# Patient Record
Sex: FEMALE | Race: ASIAN | URBAN - METROPOLITAN AREA
[De-identification: names, ages, dates, MRNs, and addresses within clinical notes are randomized per-mention and may not be internally consistent; named-entity substitution may affect disease eponyms.]

---

## 2022-12-12 ENCOUNTER — OFFICE VISIT (OUTPATIENT)
Dept: FAMILY MEDICINE CLINIC | Facility: CLINIC | Age: 47
End: 2022-12-12
Payer: COMMERCIAL

## 2022-12-12 VITALS
OXYGEN SATURATION: 99 % | DIASTOLIC BLOOD PRESSURE: 79 MMHG | SYSTOLIC BLOOD PRESSURE: 115 MMHG | BODY MASS INDEX: 18.88 KG/M2 | HEIGHT: 61 IN | HEART RATE: 77 BPM | TEMPERATURE: 98 F | WEIGHT: 100 LBS | RESPIRATION RATE: 16 BRPM

## 2022-12-12 DIAGNOSIS — R39.9 UTI SYMPTOMS: ICD-10-CM

## 2022-12-12 DIAGNOSIS — N30.00 ACUTE CYSTITIS WITHOUT HEMATURIA: Primary | ICD-10-CM

## 2022-12-12 LAB
BILIRUBIN: NEGATIVE
GLUCOSE (URINE DIPSTICK): NEGATIVE MG/DL
KETONES (URINE DIPSTICK): NEGATIVE MG/DL
MULTISTIX LOT#: ABNORMAL NUMERIC
NITRITE, URINE: NEGATIVE
OCCULT BLOOD: NEGATIVE
PH, URINE: 6.5 (ref 4.5–8)
PROTEIN (URINE DIPSTICK): NEGATIVE MG/DL
SPECIFIC GRAVITY: 1.01 (ref 1–1.03)
URINE-COLOR: YELLOW
UROBILINOGEN,SEMI-QN: 0.2 MG/DL (ref 0–1.9)

## 2022-12-12 PROCEDURE — 87086 URINE CULTURE/COLONY COUNT: CPT | Performed by: NURSE PRACTITIONER

## 2022-12-12 RX ORDER — CHLORAL HYDRATE 500 MG
CAPSULE ORAL
COMMUNITY

## 2022-12-12 RX ORDER — NICOTINE POLACRILEX 2 MG
GUM BUCCAL
COMMUNITY

## 2022-12-12 RX ORDER — PHENAZOPYRIDINE HYDROCHLORIDE 200 MG/1
200 TABLET, FILM COATED ORAL 3 TIMES DAILY PRN
Qty: 9 TABLET | Refills: 0 | Status: SHIPPED | OUTPATIENT
Start: 2022-12-12

## 2022-12-12 RX ORDER — NITROFURANTOIN 25; 75 MG/1; MG/1
100 CAPSULE ORAL 2 TIMES DAILY
Qty: 10 CAPSULE | Refills: 0 | Status: SHIPPED | OUTPATIENT
Start: 2022-12-12 | End: 2022-12-17

## 2023-03-07 ENCOUNTER — OFFICE VISIT (OUTPATIENT)
Dept: OBGYN CLINIC | Facility: CLINIC | Age: 48
End: 2023-03-07
Payer: COMMERCIAL

## 2023-03-07 VITALS
HEIGHT: 61 IN | BODY MASS INDEX: 19 KG/M2 | WEIGHT: 100.63 LBS | HEART RATE: 79 BPM | SYSTOLIC BLOOD PRESSURE: 118 MMHG | DIASTOLIC BLOOD PRESSURE: 62 MMHG

## 2023-03-07 DIAGNOSIS — Z12.11 SCREEN FOR COLON CANCER: ICD-10-CM

## 2023-03-07 DIAGNOSIS — Z01.419 WELL WOMAN EXAM WITH ROUTINE GYNECOLOGICAL EXAM: Primary | ICD-10-CM

## 2023-03-07 DIAGNOSIS — Z11.51 SCREENING FOR HUMAN PAPILLOMAVIRUS (HPV): ICD-10-CM

## 2023-03-07 DIAGNOSIS — N92.6 IRREGULAR MENSES: ICD-10-CM

## 2023-03-07 DIAGNOSIS — Z12.4 SCREENING FOR CERVICAL CANCER: ICD-10-CM

## 2023-03-07 DIAGNOSIS — Z12.31 ENCOUNTER FOR SCREENING MAMMOGRAM FOR MALIGNANT NEOPLASM OF BREAST: ICD-10-CM

## 2023-03-07 PROCEDURE — 3074F SYST BP LT 130 MM HG: CPT | Performed by: NURSE PRACTITIONER

## 2023-03-07 PROCEDURE — 3078F DIAST BP <80 MM HG: CPT | Performed by: NURSE PRACTITIONER

## 2023-03-07 PROCEDURE — 87624 HPV HI-RISK TYP POOLED RSLT: CPT | Performed by: NURSE PRACTITIONER

## 2023-03-07 PROCEDURE — 99396 PREV VISIT EST AGE 40-64: CPT | Performed by: NURSE PRACTITIONER

## 2023-03-07 PROCEDURE — 3008F BODY MASS INDEX DOCD: CPT | Performed by: NURSE PRACTITIONER

## 2023-03-07 RX ORDER — COVID-19 ANTIGEN TEST
KIT MISCELLANEOUS
COMMUNITY
Start: 2022-10-11

## 2023-03-07 RX ORDER — VITAMIN E 268 MG
400 CAPSULE ORAL DAILY
COMMUNITY

## 2023-03-08 LAB — HPV I/H RISK 1 DNA SPEC QL NAA+PROBE: NEGATIVE

## 2023-03-23 LAB
LAST PAP RESULT: NORMAL
PAP HISTORY (OTHER THAN LAST PAP): NORMAL

## 2023-10-09 ENCOUNTER — OFFICE VISIT (OUTPATIENT)
Dept: FAMILY MEDICINE CLINIC | Facility: CLINIC | Age: 48
End: 2023-10-09
Payer: COMMERCIAL

## 2023-10-09 VITALS
TEMPERATURE: 98 F | HEART RATE: 89 BPM | OXYGEN SATURATION: 97 % | HEIGHT: 61 IN | WEIGHT: 101 LBS | DIASTOLIC BLOOD PRESSURE: 72 MMHG | SYSTOLIC BLOOD PRESSURE: 106 MMHG | RESPIRATION RATE: 16 BRPM | BODY MASS INDEX: 19.07 KG/M2

## 2023-10-09 DIAGNOSIS — H92.02 OTALGIA OF LEFT EAR: ICD-10-CM

## 2023-10-09 DIAGNOSIS — H69.92 ETD (EUSTACHIAN TUBE DYSFUNCTION), LEFT: Primary | ICD-10-CM

## 2023-10-09 DIAGNOSIS — J34.89 SINUS PAIN: ICD-10-CM

## 2023-10-09 DIAGNOSIS — J06.9 VIRAL URI WITH COUGH: ICD-10-CM

## 2023-10-09 PROCEDURE — 99213 OFFICE O/P EST LOW 20 MIN: CPT | Performed by: NURSE PRACTITIONER

## 2023-10-09 PROCEDURE — 3008F BODY MASS INDEX DOCD: CPT | Performed by: NURSE PRACTITIONER

## 2023-10-09 PROCEDURE — 3078F DIAST BP <80 MM HG: CPT | Performed by: NURSE PRACTITIONER

## 2023-10-09 PROCEDURE — 3074F SYST BP LT 130 MM HG: CPT | Performed by: NURSE PRACTITIONER

## 2023-10-09 RX ORDER — FLUTICASONE PROPIONATE 50 MCG
2 SPRAY, SUSPENSION (ML) NASAL DAILY
Qty: 1 EACH | Refills: 0 | Status: SHIPPED | OUTPATIENT
Start: 2023-10-09

## 2023-10-09 RX ORDER — BENZONATATE 200 MG/1
200 CAPSULE ORAL 3 TIMES DAILY PRN
Qty: 30 CAPSULE | Refills: 0 | Status: SHIPPED | OUTPATIENT
Start: 2023-10-09

## 2024-03-02 ENCOUNTER — OFFICE VISIT (OUTPATIENT)
Dept: FAMILY MEDICINE CLINIC | Facility: CLINIC | Age: 49
End: 2024-03-02
Payer: COMMERCIAL

## 2024-03-02 VITALS
RESPIRATION RATE: 16 BRPM | BODY MASS INDEX: 18.88 KG/M2 | DIASTOLIC BLOOD PRESSURE: 65 MMHG | HEART RATE: 83 BPM | SYSTOLIC BLOOD PRESSURE: 105 MMHG | WEIGHT: 100 LBS | HEIGHT: 61 IN | OXYGEN SATURATION: 98 % | TEMPERATURE: 97 F

## 2024-03-02 DIAGNOSIS — R05.8 POST-VIRAL COUGH SYNDROME: Primary | ICD-10-CM

## 2024-03-02 PROCEDURE — 3078F DIAST BP <80 MM HG: CPT | Performed by: NURSE PRACTITIONER

## 2024-03-02 PROCEDURE — 3008F BODY MASS INDEX DOCD: CPT | Performed by: NURSE PRACTITIONER

## 2024-03-02 PROCEDURE — 3074F SYST BP LT 130 MM HG: CPT | Performed by: NURSE PRACTITIONER

## 2024-03-02 PROCEDURE — 99213 OFFICE O/P EST LOW 20 MIN: CPT | Performed by: NURSE PRACTITIONER

## 2024-03-02 RX ORDER — BENZONATATE 200 MG/1
200 CAPSULE ORAL 3 TIMES DAILY PRN
Qty: 30 CAPSULE | Refills: 0 | Status: SHIPPED | OUTPATIENT
Start: 2024-03-02 | End: 2024-03-12

## 2024-03-02 NOTE — PROGRESS NOTES
CHIEF COMPLAINT:     Chief Complaint   Patient presents with    Cough     Cough, x2 weeks           HPI:   Mayi Venegas is a 48 year old female who presents for ill symptoms for 2 weeks. Patient reports cough. Denies fever, chills, nasal congestion, ear pain, headache. Symptoms have been better since onset.  Treating symptoms with tessalon which helped, ran out.  ill with similar symptoms.       Current Outpatient Medications   Medication Sig Dispense Refill    benzonatate 200 MG Oral Cap Take 1 capsule (200 mg total) by mouth 3 (three) times daily as needed for cough. 30 capsule 0    fluticasone propionate 50 MCG/ACT Nasal Suspension 2 sprays by Each Nare route daily. 1 each 0    Vitamin E 180 MG (400 UNIT) Oral Cap Take 400 Units by mouth daily.      Ascorbic Acid 100 MG Oral Chew Tab Chew by mouth.      Biotin 1 MG Oral Cap Indications: one capsule po daily      Calcium Citrate-Vitamin D 1000-0.01 MG/30ML Oral Liquid Take by mouth.      cholecalciferol 1000 UNITS Oral Cap Indications: one capsule po daily      cyanocobalamin 100 MCG Oral Tab Take by mouth.      Elderberry 500 MG Oral Cap Take by mouth.      omega-3 fatty acids 1000 MG Oral Cap Indications: one capsule po daily      MULTIPLE VITAMIN-FOLIC ACID OR Take by mouth daily.      MULTIPLE VITAMINS-MINERALS ER OR Take by mouth.      benzonatate 200 MG Oral Cap Take 1 capsule (200 mg total) by mouth 3 (three) times daily as needed. 30 capsule 0    FLOWFLEX COVID-19 AG HOME TEST In Vitro Kit       phenazopyridine 200 MG Oral Tab Take 1 tablet (200 mg total) by mouth 3 (three) times daily as needed for Pain. 9 tablet 0      Past Medical History:   Diagnosis Date    Mitral valve prolapse       Past Surgical History:   Procedure Laterality Date    TUBAL LIGATION           Social History     Socioeconomic History    Marital status:    Tobacco Use    Smoking status: Never    Smokeless tobacco: Never   Vaping Use    Vaping Use: Never used    Substance and Sexual Activity    Alcohol use: Never    Drug use: Never    Sexual activity: Yes   Other Topics Concern    Caffeine Concern Yes    Exercise Yes    Seat Belt Yes         REVIEW OF SYSTEMS:   GENERAL: feels well otherwise, good appetite  SKIN: no rashes or abnormal skin lesions  HEENT: See HPI  LUNGS: denies shortness of breath or wheezing, See HPI  CARDIOVASCULAR: denies chest pain or palpitations   GI: denies N/V/C or abdominal pain  NEURO: Denies headaches    EXAM:   /65   Pulse 83   Temp 97.3 °F (36.3 °C) (Temporal)   Resp 16   Ht 5' 1\" (1.549 m)   Wt 100 lb (45.4 kg)   LMP 02/10/2024 (Approximate)   SpO2 98%   BMI 18.89 kg/m²   GENERAL: well developed, well nourished, in no apparent distress  SKIN: no rashes, no suspicious lesions  HEENT: atraumatic, normocephalic. conjunctiva clear. TM's gray, no bulging, no retraction, + fluid, bony landmarks intact. clear nasal discharge, nasal mucosa erythematous and swollen. Oral mucosa pink, moist. Posterior pharynx is not erythematous. no exudates. no Sinus tenderness with palpation.   THROAT:NECK: Supple, non-tender  LUNGS: clear to auscultation   CARDIO: RRR without murmur  EXTREMITIES: no cyanosis, clubbing or edema  LYMP: bilateral anterior cervical lymphadenopathy.    ASSESSMENT AND PLAN:   Mayi Venegas is a 48 year old female who presents with     Skyeo was seen today for cough.    Diagnoses and all orders for this visit:    Post-viral cough syndrome    Other orders  -     benzonatate 200 MG Oral Cap; Take 1 capsule (200 mg total) by mouth 3 (three) times daily as needed for cough.       Risks, benefits, and side effects of medication explained and discussed.    Discussed physical exam and hpi with pt. No bacterial focus noted on exam. Pt has reassuring physical exam consistent with viral uri. Lungs clear bilat. No respiratory distress noted. Treatment options discussed with patient and explained in detail. We reviewed  symptomatic care at home. The risks, benefits and potential side effects of possible medications were reviewed. Alternatives were discussed. Monitoring parameters and expected course outlined. Patient to call PCP or go to emergency department if symptoms fail to respond as outlined, or worsen in any way. The patient agreed with the plan.  See Patient Handout    The patient indicates understanding of these issues and agrees to the plan.  The patient is asked to follow up with PCP if sx's persist or worsen.

## 2024-06-28 ENCOUNTER — OFFICE VISIT (OUTPATIENT)
Facility: LOCATION | Age: 49
End: 2024-06-28

## 2024-06-28 ENCOUNTER — HOSPITAL ENCOUNTER (OUTPATIENT)
Dept: GENERAL RADIOLOGY | Age: 49
Discharge: HOME | End: 2024-06-28
Attending: PODIATRIST

## 2024-06-28 DIAGNOSIS — Z87.81 HISTORY OF FRACTURE OF RIGHT ANKLE: Primary | ICD-10-CM

## 2024-06-28 DIAGNOSIS — M25.572 LEFT ANKLE PAIN, UNSPECIFIED CHRONICITY: ICD-10-CM

## 2024-06-28 DIAGNOSIS — M25.579 ANKLE PAIN, UNSPECIFIED CHRONICITY, UNSPECIFIED LATERALITY: ICD-10-CM

## 2024-06-28 DIAGNOSIS — S82.65XP: ICD-10-CM

## 2024-06-28 PROCEDURE — 99203 OFFICE O/P NEW LOW 30 MIN: CPT | Performed by: PODIATRIST

## 2024-06-28 NOTE — PROGRESS NOTES
[facility]  Progress Note    Mayi Venegas is a 48 year old female.   Chief Complaint   Patient presents with    Injury     Consult- R ankle- onset- 3/11/2020 injured while playing tennis- was told she have a fx - per pt she refused casting her foot instead she was seen by PT and wears the boot- currently she's active playing tennis but still experiencing pain 2/10 on and off         HPI:     Patient is a pleasant 48-year-old female who is presenting to clinic today accompanied by her  for right ankle evaluation.  Patient states that she had a fibular fracture on 3/11/2020 when she lived in [place].  She states that this was conservatively treated as she was told that the injury/surgery was \"not essential\".  Patient did nonweightbearing with Ace bandage and subsequently did physical therapy.  She states that she continues to do well overall.  She is active and likes to play tennis.  She is currently having some discomfort, only with playing tennis.  She states that the pain can be as bad as 2/10 and does state that it is intermittent.  Denying any current pain.  Patient had been wearing a lace up ankle brace until about a year ago.  She is continuing to play tennis, but wanted it evaluated due to ongoing mild pain.  Denies recent injury or other complaints.  She is here today for further evaluation and care.    Allergies: Patient has no known allergies.   Current Outpatient Medications   Medication Sig Dispense Refill    fluticasone propionate 50 MCG/ACT Nasal Suspension 2 sprays by Each Nare route daily. 1 each 0    benzonatate 200 MG Oral Cap Take 1 capsule (200 mg total) by mouth 3 (three) times daily as needed. 30 capsule 0    FLOWFLEX COVID-19 AG HOME TEST In Vitro Kit       Vitamin E 180 MG (400 UNIT) Oral Cap Take 400 Units by mouth daily.      Ascorbic Acid 100 MG Oral Chew Tab Chew by mouth.      Biotin 1 MG Oral Cap Indications: one capsule po daily      Calcium Citrate-Vitamin D  1000-0.01 MG/30ML Oral Liquid Take by mouth.      cholecalciferol 1000 UNITS Oral Cap Indications: one capsule po daily      cyanocobalamin 100 MCG Oral Tab Take by mouth.      Elderberry 500 MG Oral Cap Take by mouth.      omega-3 fatty acids 1000 MG Oral Cap Indications: one capsule po daily      MULTIPLE VITAMIN-FOLIC ACID OR Take by mouth daily.      MULTIPLE VITAMINS-MINERALS ER OR Take by mouth.      phenazopyridine 200 MG Oral Tab Take 1 tablet (200 mg total) by mouth 3 (three) times daily as needed for Pain. 9 tablet 0      Past Medical History:    Mitral valve prolapse      Past Surgical History:   Procedure Laterality Date    Tubal ligation        No family history on file.   Social History     Socioeconomic History    Marital status:    Tobacco Use    Smoking status: Never    Smokeless tobacco: Never   Vaping Use    Vaping status: Never Used   Substance and Sexual Activity    Alcohol use: Never    Drug use: Never    Sexual activity: Yes   Other Topics Concern    Caffeine Concern Yes    Exercise Yes    Seat Belt Yes           REVIEW OF SYSTEMS:     10 point ROS completed and was negative, except for pertinent positive and negatives stated in subjective.       EXAM:     Right lower extremity focused exam:  GENERAL: well developed, well nourished, in no apparent distress  EXTREMITIES:  1. Integument: Skin appears moist, warm, and supple with positive hair growth. There are no color changes. No open lesions. No macerations. No Hyperkeratotic lesions.   2. Vascular: Dorsalis pedis 2/4 bilateral and posterior tibial pulses 2/4 bilateral, capillary refill normal.  3. Neurological: Gross sensation intact via light touch bilaterally.  Normal sharp/dull sensation  4. Musculoskeletal: All muscle groups are graded 5/5 in the foot and ankle with no pain elicited in any direction against resistance.  There is a palpable abnormality to the distal fibula consistent with patient's previous lateral malleolus  fracture.  There is no pain with palpation to distal fibula today.  She does have some pain with palpation to the most distal and posterior portion of the fibula.  There is no palpable clicking or catching within the ankle joint during range of motion.       ASSESSMENT AND PLAN:   Diagnoses and all orders for this visit:    History of fracture of right ankle    Closed nondisplaced fracture of lateral malleolus of left fibula with malunion, subsequent encounter    Left ankle pain, unspecified chronicity  -     XR ANKLE WEIGHTBEARING (3 VIEWS), RIGHT (CPT=73610); Future        Plan:   -Patient was seen and evaluated today in clinic.  Patient did bring both previous radiographs on a disc, as well as old office/PT visits from 2020 when she was living in [place].  I was able to review all of this today.    Updated radiographs were obtained today and independently reviewed, revealing a malunited lateral malleolus fracture.  Maintain anatomic ankle joint with a very small spike within the lateral ankle gutter noted.  No other osseous abnormalities are present.    Clinically, it does appear that patient's peroneal tendon is rubbing up against the malunited distal fibular fracture.  She has mild discomfort along the course of the peroneal tendons at this location only.  No pain with activation of peroneal tendons against resistance today.    Discussed both clinical and radiographic findings with patient today and further treatment recommendations.    Because patient is only experiencing mild discomfort with increased activity playing tennis with no limitations, recommend managing this conservatively.  Discussed importance of added support, especially with tennis.  She does have a lace up ankle brace that she has been utilizing, but states that she needs a new one.  Patient was provided with a new lace up ankle brace today.  Encourage patient to utilize when playing tennis only.  Discussed the long-term effects of chronic ankle  brace use.  I explained that utilizing during tenderness would not be detrimental to the ankle brace, and would be more supportive for the ankle    I did recommend the patient resting, icing, and elevating with any increasing pain.  She should avoid any activities that do increase her pain    Patient also understands that she is at a higher risk of posttraumatic arthritis due to her previous ankle fracture.    If patient's symptoms worsen or do not improve, I would recommend she follow-up for further evaluation.  Can look into formal physical therapy and advanced imaging in the future if needed    -The patient indicates understanding of these issues and agrees to the plan.    Time spent reviewing pertinent information from patient's chart, reviewing any pertinent imaging, obtaining history and physical exam, discussing and mutually agreeing on a treatment plan, and documenting encounter: 35 minutes    RTC as needed      Antelmo Menjivar DPM        6/28/2024    Dragon speech recognition software was used to prepare this note.  Errors in word recognition may occur.  Please contact me with any questions/concerns with this note.

## 2025-01-18 ENCOUNTER — OFFICE VISIT (OUTPATIENT)
Dept: FAMILY MEDICINE CLINIC | Facility: CLINIC | Age: 50
End: 2025-01-18
Payer: COMMERCIAL

## 2025-01-18 VITALS
TEMPERATURE: 99 F | HEART RATE: 74 BPM | OXYGEN SATURATION: 99 % | DIASTOLIC BLOOD PRESSURE: 72 MMHG | BODY MASS INDEX: 20 KG/M2 | WEIGHT: 104.19 LBS | SYSTOLIC BLOOD PRESSURE: 104 MMHG

## 2025-01-18 DIAGNOSIS — J06.9 UPPER RESPIRATORY TRACT INFECTION, UNSPECIFIED TYPE: Primary | ICD-10-CM

## 2025-01-18 LAB
OPERATOR ID: NORMAL
POCT LOT NUMBER: NORMAL
RAPID SARS-COV-2 BY PCR: NOT DETECTED

## 2025-01-18 NOTE — PROGRESS NOTES
CHIEF COMPLAINT:     Chief Complaint   Patient presents with    Cough     Sinus pressure with burning, sore throat with chest discomfort for 3 days.  OTC meds taken.         HPI:   Mayi Venegas is a 49 year old female who presents for upper respiratory symptoms for  3 days. Patient reports sore throat, congestion, dry cough, sinus pain. Symptoms have been unchanged since onset.  Treating symptoms with advil cold and sinus, tylenol cold and sinus.      Current Outpatient Medications   Medication Sig Dispense Refill    fluticasone propionate 50 MCG/ACT Nasal Suspension 2 sprays by Each Nare route daily. 1 each 0    benzonatate 200 MG Oral Cap Take 1 capsule (200 mg total) by mouth 3 (three) times daily as needed. 30 capsule 0    FLOWFLEX COVID-19 AG HOME TEST In Vitro Kit       Vitamin E 180 MG (400 UNIT) Oral Cap Take 400 Units by mouth daily.      Ascorbic Acid 100 MG Oral Chew Tab Chew by mouth.      Biotin 1 MG Oral Cap Indications: one capsule po daily      Calcium Citrate-Vitamin D 1000-0.01 MG/30ML Oral Liquid Take by mouth.      cholecalciferol 1000 UNITS Oral Cap Indications: one capsule po daily      cyanocobalamin 100 MCG Oral Tab Take by mouth.      Elderberry 500 MG Oral Cap Take by mouth.      omega-3 fatty acids 1000 MG Oral Cap Indications: one capsule po daily      MULTIPLE VITAMIN-FOLIC ACID OR Take by mouth daily.      MULTIPLE VITAMINS-MINERALS ER OR Take by mouth.      phenazopyridine 200 MG Oral Tab Take 1 tablet (200 mg total) by mouth 3 (three) times daily as needed for Pain. 9 tablet 0      Past Medical History:    Mitral valve prolapse      Past Surgical History:   Procedure Laterality Date    Tubal ligation           Social History     Socioeconomic History    Marital status:    Tobacco Use    Smoking status: Never    Smokeless tobacco: Never   Vaping Use    Vaping status: Never Used   Substance and Sexual Activity    Alcohol use: Never    Drug use: Never    Sexual activity: Yes    Other Topics Concern    Caffeine Concern Yes    Exercise Yes    Seat Belt Yes     Social Drivers of Health     Financial Resource Strain: Low Risk  (10/31/2022)    [facility]    Overall Financial Resource Strain (CARDIA)     Difficulty of Paying Living Expenses: Not hard at all   Food Insecurity: No Food Insecurity (10/31/2022)    [facility]    Hunger Vital Sign     Worried About Running Out of Food in the Last Year: Never true     Ran Out of Food in the Last Year: Never true   Transportation Needs: No Transportation Needs (10/31/2022)    [facility]    PRAPARE - Transportation     Lack of Transportation (Medical): No     Lack of Transportation (Non-Medical): No         REVIEW OF SYSTEMS:   GENERAL: normal appetite  SKIN: no rashes or abnormal skin lesions  HEENT: See HPI  LUNGS: See HPI  CARDIOVASCULAR: denies chest pain or palpitations   GI: denies N/V/C or abdominal pain      EXAM:   /72   Pulse 74   Temp 98.9 °F (37.2 °C) (Oral)   Wt 104 lb 3.2 oz (47.3 kg)   LMP 12/29/2024 (Approximate)   SpO2 99%   BMI 19.69 kg/m²   GENERAL: well developed, well nourished,in no apparent distress  SKIN: no rashes,no suspicious lesions  HEAD: atraumatic, normocephalic.  pos tenderness on palpation of frontal sinuses  EYES: conjunctiva clear, EOM intact  EARS: TM's pearly, no bulging, no retraction,no fluid, bony landmarks visible  NOSE: Nostrils patent, clear nasal discharge, nasal mucosa red and non-inflamed   THROAT: Oral mucosa pink, moist. Posterior pharynx is not erythematous. no exudates. Tonsils 1/4.    NECK: Supple, non-tender  LUNGS: clear to auscultation bilaterally, +cough, no wheezes or rhonchi. Breathing is non labored.  CARDIO: RRR without murmur  EXTREMITIES: no cyanosis, clubbing or  edema  LYMPH:  no lymphadenopathy.        ASSESSMENT AND PLAN:   Mayi Venegas is a 49 year old female who presents with upper respiratory symptoms that are consistent with    ASSESSMENT:   Encounter Diagnosis   Name Primary?    Upper respiratory tract infection, unspecified type Yes       PLAN: Meds as below.  Comfort care as described in Patient Instructions  Educated on viral vs bacterial  Educated on supportive measures: ibuprofen/tylenol, hydration,   Educated on s/s of worsening sx and when to seek higher level of care  Follow up with pcp if not improving      Meds & Refills for this Visit:  Requested Prescriptions      No prescriptions requested or ordered in this encounter     Risks, benefits, and side effects of medication explained and discussed.    The patient indicates understanding of these issues and agrees to the plan.  The patient is asked to f/u with PCP if sx's persist or worsen.  There are no Patient Instructions on file for this visit.

## 2025-05-28 ENCOUNTER — OFFICE VISIT (OUTPATIENT)
Dept: FAMILY MEDICINE CLINIC | Facility: CLINIC | Age: 50
End: 2025-05-28
Payer: COMMERCIAL

## 2025-05-28 VITALS
RESPIRATION RATE: 16 BRPM | OXYGEN SATURATION: 99 % | DIASTOLIC BLOOD PRESSURE: 68 MMHG | HEART RATE: 73 BPM | BODY MASS INDEX: 20 KG/M2 | TEMPERATURE: 98 F | SYSTOLIC BLOOD PRESSURE: 110 MMHG | WEIGHT: 106.38 LBS

## 2025-05-28 DIAGNOSIS — N30.00 ACUTE CYSTITIS WITHOUT HEMATURIA: ICD-10-CM

## 2025-05-28 DIAGNOSIS — R39.9 UTI SYMPTOMS: Primary | ICD-10-CM

## 2025-05-28 LAB
BILIRUBIN: NEGATIVE
GLUCOSE (URINE DIPSTICK): NEGATIVE MG/DL
MULTISTIX LOT#: ABNORMAL NUMERIC
NITRITE, URINE: NEGATIVE
PH, URINE: 6.5 (ref 4.5–8)
PROTEIN (URINE DIPSTICK): 30 MG/DL
SPECIFIC GRAVITY: 1.02 (ref 1–1.03)
URINE-COLOR: YELLOW
UROBILINOGEN,SEMI-QN: 0.2 MG/DL (ref 0–1.9)

## 2025-05-28 PROCEDURE — 3078F DIAST BP <80 MM HG: CPT | Performed by: NURSE PRACTITIONER

## 2025-05-28 PROCEDURE — 3074F SYST BP LT 130 MM HG: CPT | Performed by: NURSE PRACTITIONER

## 2025-05-28 PROCEDURE — 87086 URINE CULTURE/COLONY COUNT: CPT | Performed by: NURSE PRACTITIONER

## 2025-05-28 PROCEDURE — 87186 SC STD MICRODIL/AGAR DIL: CPT | Performed by: NURSE PRACTITIONER

## 2025-05-28 PROCEDURE — 81003 URINALYSIS AUTO W/O SCOPE: CPT | Performed by: NURSE PRACTITIONER

## 2025-05-28 PROCEDURE — 87077 CULTURE AEROBIC IDENTIFY: CPT | Performed by: NURSE PRACTITIONER

## 2025-05-28 PROCEDURE — 99213 OFFICE O/P EST LOW 20 MIN: CPT | Performed by: NURSE PRACTITIONER

## 2025-05-28 RX ORDER — NITROFURANTOIN 25; 75 MG/1; MG/1
100 CAPSULE ORAL 2 TIMES DAILY
Qty: 10 CAPSULE | Refills: 0 | Status: SHIPPED | OUTPATIENT
Start: 2025-05-28 | End: 2025-06-02

## 2025-05-28 NOTE — PROGRESS NOTES
CHIEF COMPLAINT:     Chief Complaint   Patient presents with    UTI     Frequent urination, pressure and burning for 2 days. OTC Ibuprofen taken.        HPI:   Mayi Venegas is a 49 year old female who presents with symptoms of UTI. Patient reporting symptoms of dysuria, frequency, suprapubic pressure for 2 days.  Symptoms have been persisting since onset.  Treatments tried: ibuprofen for pain management.  Associated symptoms: negative for fever, N/V,   Patient denies flank pain, fever, hematuria, nausea, or vomiting. Patient denies genital discharge or itching. Patient denies new sexual partners in the last 3 months. Patient denies recent unprotected sexual intercourse.     Current Medications[1]   Past Medical History[2]   Social History:  Short Social Hx on File[3]      REVIEW OF SYSTEMS:   GENERAL: See above  GI: See HPI.    : See HPI.      EXAM:   /68   Pulse 73   Temp 97.9 °F (36.6 °C) (Oral)   Resp 16   Wt 106 lb 6.4 oz (48.3 kg)   LMP 05/18/2025 (Approximate)   SpO2 99%   BMI 20.10 kg/m²   GENERAL: well developed, well nourished,in no apparent distress  CARDIO: RRR, no murmurs  LUNGS: clear to ausculation bilaterally, no wheezing or rhonchi  GI: BS present x 4.  No hepatosplenomegaly.  : No suprapubic tenderness. No bladder distention, or CVAT     Recent Results (from the past 24 hours)   Urine Dip, auto without Micro    Collection Time: 05/28/25  8:56 AM   Result Value Ref Range    Glucose Urine Negative Negative mg/dL    Bilirubin Urine Negative Negative    Ketones, UA Trace Negative - Trace mg/dL    Spec Gravity 1.020 1.005 - 1.030    Blood Urine Moderate (A) Negative    PH Urine 6.5 5.0 - 8.0    Protein Urine 30 (A) Negative - Trace mg/dL    Urobilinogen Urine 0.2 0.2 - 1.0 mg/dL    Nitrite Urine Negative Negative    Leukocyte Esterase Urine Moderate (A) Negative    APPEARANCE Cloudy Clear    Color Urine yellow Yellow    Multistix Lot# [id number] Numeric    Multistix Expiration Date  2/28/2026 Date         ASSESSMENT AND PLAN:   Mayi Venegas is a 49 year old female presents with urinary symptoms.    ASSESSMENT:  Encounter Diagnoses   Name Primary?    UTI symptoms Yes    Acute cystitis without hematuria        PLAN: Meds as listed below.  Comfort measures as described in Patient Instructions. Will send urine culture as discussed.     Meds & Refills for this Visit:  Requested Prescriptions     Signed Prescriptions Disp Refills    nitrofurantoin monohydrate macro (MACROBID) 100 MG Oral Cap 10 capsule 0     Sig: Take 1 capsule (100 mg total) by mouth 2 (two) times daily for 5 days.       Risk and benefits of medication discussed.   Stressed importance of completing full course of antibiotic unless told otherwise.     The patient indicates understanding of these issues and agrees to the plan.  The patient is asked to see PCP in 3 days if not better. Seek care immediately for new onset of fever, vomiting, worsening symptoms.                 [1]   Current Outpatient Medications   Medication Sig Dispense Refill    nitrofurantoin monohydrate macro (MACROBID) 100 MG Oral Cap Take 1 capsule (100 mg total) by mouth 2 (two) times daily for 5 days. 10 capsule 0    fluticasone propionate 50 MCG/ACT Nasal Suspension 2 sprays by Each Nare route daily. 1 each 0    benzonatate 200 MG Oral Cap Take 1 capsule (200 mg total) by mouth 3 (three) times daily as needed. 30 capsule 0    FLOWFLEX COVID-19 AG HOME TEST In Vitro Kit       Vitamin E 180 MG (400 UNIT) Oral Cap Take 400 Units by mouth daily.      Ascorbic Acid 100 MG Oral Chew Tab Chew by mouth.      Biotin 1 MG Oral Cap Indications: one capsule po daily      Calcium Citrate-Vitamin D 1000-0.01 MG/30ML Oral Liquid Take by mouth.      cholecalciferol 1000 UNITS Oral Cap Indications: one capsule po daily      cyanocobalamin 100 MCG Oral Tab Take by mouth.      Elderberry 500 MG Oral Cap Take by mouth.      omega-3 fatty acids 1000 MG Oral Cap Indications:  one capsule po daily      MULTIPLE VITAMIN-FOLIC ACID OR Take by mouth daily.      MULTIPLE VITAMINS-MINERALS ER OR Take by mouth.      phenazopyridine 200 MG Oral Tab Take 1 tablet (200 mg total) by mouth 3 (three) times daily as needed for Pain. 9 tablet 0   [2]   Past Medical History:   Mitral valve prolapse   [3]   Social History  Socioeconomic History    Marital status:    Tobacco Use    Smoking status: Never    Smokeless tobacco: Never   Vaping Use    Vaping status: Never Used   Substance and Sexual Activity    Alcohol use: Never    Drug use: Never    Sexual activity: Yes   Other Topics Concern    Caffeine Concern Yes    Exercise Yes    Seat Belt Yes     Social Drivers of Health     Food Insecurity: No Food Insecurity (10/31/2022)    Received from [facility]    Hunger Vital Sign     Worried About Running Out of Food in the Last Year: Never true     Ran Out of Food in the Last Year: Never true   Transportation Needs: No Transportation Needs (10/31/2022)    Received from [facility]    PRAPARE - Transportation     Lack of Transportation (Medical): No     Lack of Transportation (Non-Medical): No